# Patient Record
Sex: FEMALE | Race: WHITE | NOT HISPANIC OR LATINO | Employment: FULL TIME | RURAL
[De-identification: names, ages, dates, MRNs, and addresses within clinical notes are randomized per-mention and may not be internally consistent; named-entity substitution may affect disease eponyms.]

---

## 2017-04-28 ENCOUNTER — OFFICE VISIT (OUTPATIENT)
Dept: OTOLARYNGOLOGY | Facility: CLINIC | Age: 32
End: 2017-04-28

## 2017-04-28 VITALS — TEMPERATURE: 98.4 F | WEIGHT: 293 LBS | HEIGHT: 66 IN | BODY MASS INDEX: 47.09 KG/M2

## 2017-04-28 DIAGNOSIS — M62.838: ICD-10-CM

## 2017-04-28 DIAGNOSIS — H92.09 OTALGIA, UNSPECIFIED LATERALITY: Primary | ICD-10-CM

## 2017-04-28 PROCEDURE — 99203 OFFICE O/P NEW LOW 30 MIN: CPT | Performed by: OTOLARYNGOLOGY

## 2017-04-28 RX ORDER — FLUTICASONE PROPIONATE 50 MCG
2 SPRAY, SUSPENSION (ML) NASAL DAILY
COMMUNITY

## 2017-04-28 NOTE — PROGRESS NOTES
Subjective   Benita Odonnell is a 31 y.o. female.     History of Present Illness   Patient reports that since December she has been treated for ear infection on at least 3 occasions.  Acute symptoms typically are ear pain and postauricular pain.  Hearing seems to be preserved.  Has intermittent brief symptoms of tinnitus and decreased hearing that only last a few seconds and occur proximal once every 2 weeks.  No otorrhea, no previous otologic surgery.  Says she really didn't have trouble with her ears prior to this December.      The following portions of the patient's history were reviewed and updated as appropriate: allergies, current medications, past family history, past medical history, past social history, past surgical history and problem list.      Benita Odonnell reports that she is a non-smoker but has been exposed to tobacco smoke. She does not have any smokeless tobacco history on file.  Patient is not a tobacco user and has not been counseled for use of tobacco products    Family History   Problem Relation Age of Onset   • Diabetes Mother          Current Outpatient Prescriptions:   •  fluticasone (FLONASE) 50 MCG/ACT nasal spray, 2 sprays into each nostril Daily., Disp: , Rfl:   •  Loratadine (CLARITIN PO), Take  by mouth., Disp: , Rfl:   •  Norgestimate-Eth Estradiol (SPRINTEC 28 PO), Take  by mouth., Disp: , Rfl:       No past medical history on file.  Denies hypertension, coronary artery disease, diabetes    Review of Systems   HENT: Positive for nosebleeds.    Neurological: Positive for headaches.   All other systems reviewed and are negative.          Objective   Physical Exam    General: Well-developed well-nourished female in no acute distress.  Alert and oriented ×3. Head: Normocephalic. Face: Symmetrical strength and appearance. PERRL. EOMI. Voice:Strong. Speech:Fluent  Ears: External ears no deformity, canals no discharge, tympanic membranes intact clear and mobile bilaterally.  This is confirmed  under microscopic visualization.  Nose: Nares show no discharge mass polyp or purulence.  Boggy mucosa is present.  No gross external deformity.  Septum: Midline  Oral cavity: Lips and gums without lesions.  Tongue and floor of mouth without lesions.  Parotid and submandibular ducts unobstructed.  No mucosal lesions on the buccal mucosa or vestibule of the mouth.  Pharynx: No erythema exudate mass or ulcer  Neck: No lymphadenopathy.  No thyromegaly.  Trachea and larynx midline.  No masses in the parotid or submandibular glands.  TMJ is tender to palpation bilaterally.      Assessment/Plan   Benita was seen today for ear problem.    Diagnoses and all orders for this visit:    Otalgia, unspecified laterality    Muscle spasm of tensor tympani      Plan: Explained to the patient that her ears were clear and in all likelihood her ear pain is musculoskeletal in nature.  I did explain that she describes tensor tympani spasm (brief tinnitus and decreased hearing) but given that she only has this infrequently there is no specific treatment recommended.  I did advise soft diet, anti-inflammatory medicine, and dental evaluation regarding her otalgia.  I'll see her again as needed.